# Patient Record
Sex: MALE | Race: WHITE
[De-identification: names, ages, dates, MRNs, and addresses within clinical notes are randomized per-mention and may not be internally consistent; named-entity substitution may affect disease eponyms.]

---

## 2019-08-13 ENCOUNTER — HOSPITAL ENCOUNTER (OUTPATIENT)
Dept: HOSPITAL 92 - RAD | Age: 54
Discharge: HOME | End: 2019-08-13
Attending: INTERNAL MEDICINE
Payer: COMMERCIAL

## 2019-08-13 DIAGNOSIS — R06.00: Primary | ICD-10-CM

## 2019-08-13 PROCEDURE — 71046 X-RAY EXAM CHEST 2 VIEWS: CPT

## 2019-08-13 NOTE — RAD
RADIOGRAPH CHEST 2 VIEWS:

8/13/19

 

HISTORY: 

54-year-old male with dyspnea. 

 

FINDINGS:

There is no air space density, pulmonary edema, pleural effusion, pneumothorax, or cardiomegaly.

 

IMPRESSION: 

No acute cardiopulmonary findings.

 

 

jn []

 

POS: [facility]

## 2019-08-14 ENCOUNTER — HOSPITAL ENCOUNTER (OUTPATIENT)
Dept: HOSPITAL 92 - CT | Age: 54
Discharge: HOME | End: 2019-08-14
Attending: INTERNAL MEDICINE
Payer: COMMERCIAL

## 2019-08-14 DIAGNOSIS — R07.89: Primary | ICD-10-CM

## 2019-08-14 DIAGNOSIS — N28.9: ICD-10-CM

## 2019-08-14 DIAGNOSIS — R59.0: ICD-10-CM

## 2019-08-14 PROCEDURE — 71250 CT THORAX DX C-: CPT

## 2019-08-14 PROCEDURE — A9503 TC99M MEDRONATE: HCPCS

## 2019-08-14 PROCEDURE — 78306 BONE IMAGING WHOLE BODY: CPT

## 2019-08-14 NOTE — CT
CT CHEST WITHOUT CONTRAST



CLINICAL INDICATION:

Chest pain. Patient states chest pain with every breath. 



COMPARISON:

None



FINDINGS:



Aorta: Limited evaluation due to lack of intravenous contrast; the thoracic aorta is normal in calibe
r.



Lungs: Clear without evidence of consolidation or pleural effusion.



Mediastinum: Lack of intravenous contrast limits sensitivity for evaluation of the mediastinum. There
 is mild enlargement of a right paratracheal lymph node measuring 1.4 cm in short axis dimension.

No additional enlarged lymph nodes are appreciated. This is overall nonspecific.



Thyroid gland: Normal nonenhanced CT appearance where visualized.



Osseous structures: Mild degenerative changes are seen within the thoracic spine



Chest wall: No abnormality visualized.





Upper abdomen: There is an incompletely visualized 5.9 cm hypodense cystic lesion superior pole left 
kidney, but where image this does demonstrate fluid attenuation likely represents a renal cyst. The

remainder of the visualized upper abdomen demonstrates a grossly normal nonenhanced CT appearance.







IMPRESSION:

1. No acute findings. 

2. The lungs are clear, and there is no pulmonary nodule, mass, or pleural effusion identified.

3. Nonspecific mildly enlarged right paratracheal lymph node.

4. Incompletely imaged left superior pole renal cystic lesion likely attributable to a left renal cys
t. However, for complete evaluation, a nonemergent ultrasound would be helpful for further

evaluation.



Reported By: Jitendra Beach 

Electronically Signed:  8/14/2019 12:15 PM

## 2019-08-21 ENCOUNTER — HOSPITAL ENCOUNTER (OUTPATIENT)
Dept: HOSPITAL 92 - NM | Age: 54
Discharge: HOME | End: 2019-08-21
Attending: INTERNAL MEDICINE
Payer: COMMERCIAL

## 2019-08-21 DIAGNOSIS — M19.012: ICD-10-CM

## 2019-08-21 DIAGNOSIS — M19.072: ICD-10-CM

## 2019-08-21 DIAGNOSIS — M19.011: ICD-10-CM

## 2019-08-21 DIAGNOSIS — R07.89: Primary | ICD-10-CM

## 2019-08-21 DIAGNOSIS — M19.071: ICD-10-CM

## 2019-08-21 DIAGNOSIS — Z96.651: ICD-10-CM

## 2019-08-21 PROCEDURE — 78306 BONE IMAGING WHOLE BODY: CPT

## 2019-08-21 PROCEDURE — A9503 TC99M MEDRONATE: HCPCS

## 2019-08-21 NOTE — NM
NUCLEAR MEDICINE BONE SCAN WHOLE BODY:

(Skeletal scintigraphy)



DATE:

8/21/2019



HISTORY:

54-year-old male with anterior chest wall pain



TECHNIQUE:

IV injection of technetium 99m-MDP: 30.7 mCi

3 hour delayed whole body skeletal scintigraphy in anterior and posterior views.



FINDINGS:

There are no foci of asymmetrically increased uptake that are particularly suspicious for bone metast
ases. There are multiple tiny foci of increased uptake at the lateral margins of the sternal body,

left more numerous than right. Review of chest CT demonstrates osteophytes at the joints between the 
calcified costal cartilages and the sternum. This would be responsible for the increased uptake.

Symmetrically increased uptake at bilateral AC joints. Photopenic defect at right knee surrounded by 
mildly increased uptake. Bilateral foci of significantly increased uptake at the great toes. Small

focus of increased uptake at right posterior mid lumbar spine, presumably facet osteoarthrosis.



IMPRESSION:



1. No compelling evidence of skeletal metastasis.

2. 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

3. Status post total right knee replacement arthroplasty. Some increased uptake in bone around the ha
rdware.

4. Arthritis at bilateral great toes.

5. Osteoarthrosis of bilateral acromioclavicular joints.



Reported By: Aashish Shahid 

Electronically Signed:  8/21/2019 1:29 PM

## 2020-10-04 ENCOUNTER — HOSPITAL ENCOUNTER (EMERGENCY)
Dept: HOSPITAL 92 - ERS | Age: 55
Discharge: LEFT BEFORE DISCHARGE | End: 2020-10-04
Payer: SELF-PAY

## 2020-10-04 DIAGNOSIS — F17.210: ICD-10-CM

## 2020-10-04 DIAGNOSIS — R07.9: ICD-10-CM

## 2020-10-04 DIAGNOSIS — F32.9: ICD-10-CM

## 2020-10-04 DIAGNOSIS — F41.9: ICD-10-CM

## 2020-10-04 DIAGNOSIS — Z79.899: ICD-10-CM

## 2020-10-04 DIAGNOSIS — I16.0: Primary | ICD-10-CM

## 2020-10-04 LAB
ALBUMIN SERPL BCG-MCNC: 4.8 G/DL (ref 3.5–5)
ALP SERPL-CCNC: 104 U/L (ref 40–110)
ALT SERPL W P-5'-P-CCNC: 48 U/L (ref 8–55)
ANION GAP SERPL CALC-SCNC: 16 MMOL/L (ref 10–20)
AST SERPL-CCNC: 28 U/L (ref 5–34)
BASOPHILS # BLD AUTO: 0.1 THOU/UL (ref 0–0.2)
BASOPHILS NFR BLD AUTO: 0.7 % (ref 0–1)
BILIRUB SERPL-MCNC: 0.5 MG/DL (ref 0.2–1.2)
BUN SERPL-MCNC: 17 MG/DL (ref 8.4–25.7)
CALCIUM SERPL-MCNC: 9.9 MG/DL (ref 7.8–10.44)
CHLORIDE SERPL-SCNC: 101 MMOL/L (ref 98–107)
CO2 SERPL-SCNC: 25 MMOL/L (ref 22–29)
CREAT CL PREDICTED SERPL C-G-VRATE: 0 ML/MIN (ref 70–130)
EOSINOPHIL # BLD AUTO: 0 THOU/UL (ref 0–0.7)
EOSINOPHIL NFR BLD AUTO: 0.2 % (ref 0–10)
GLOBULIN SER CALC-MCNC: 3.3 G/DL (ref 2.4–3.5)
GLUCOSE SERPL-MCNC: 117 MG/DL (ref 70–105)
HGB BLD-MCNC: 20.8 G/DL (ref 14–18)
LIPASE SERPL-CCNC: 27 U/L (ref 8–78)
LYMPHOCYTES # BLD: 2.4 THOU/UL (ref 1.2–3.4)
LYMPHOCYTES NFR BLD AUTO: 28.8 % (ref 21–51)
MAGNESIUM SERPL-MCNC: 2.2 MG/DL (ref 1.6–2.6)
MCH RBC QN AUTO: 32.4 PG (ref 27–31)
MCV RBC AUTO: 97.9 FL (ref 78–98)
MONOCYTES # BLD AUTO: 0.5 THOU/UL (ref 0.11–0.59)
MONOCYTES NFR BLD AUTO: 6.2 % (ref 0–10)
NEUTROPHILS # BLD AUTO: 5.3 THOU/UL (ref 1.4–6.5)
NEUTROPHILS NFR BLD AUTO: 64.1 % (ref 42–75)
PLATELET # BLD AUTO: 253 THOU/UL (ref 130–400)
POTASSIUM SERPL-SCNC: 4.1 MMOL/L (ref 3.5–5.1)
RBC # BLD AUTO: 6.42 MILL/UL (ref 4.7–6.1)
SODIUM SERPL-SCNC: 138 MMOL/L (ref 136–145)
TROPONIN I SERPL DL<=0.01 NG/ML-MCNC: 0.01 NG/ML (ref ?–0.03)
WBC # BLD AUTO: 8.2 THOU/UL (ref 4.8–10.8)

## 2020-10-04 PROCEDURE — 80053 COMPREHEN METABOLIC PANEL: CPT

## 2020-10-04 PROCEDURE — 87635 SARS-COV-2 COVID-19 AMP PRB: CPT

## 2020-10-04 PROCEDURE — 83735 ASSAY OF MAGNESIUM: CPT

## 2020-10-04 PROCEDURE — 84484 ASSAY OF TROPONIN QUANT: CPT

## 2020-10-04 PROCEDURE — 83880 ASSAY OF NATRIURETIC PEPTIDE: CPT

## 2020-10-04 PROCEDURE — 93005 ELECTROCARDIOGRAM TRACING: CPT

## 2020-10-04 PROCEDURE — 71045 X-RAY EXAM CHEST 1 VIEW: CPT

## 2020-10-04 PROCEDURE — 83690 ASSAY OF LIPASE: CPT

## 2020-10-04 PROCEDURE — 85025 COMPLETE CBC W/AUTO DIFF WBC: CPT

## 2020-10-04 PROCEDURE — 36415 COLL VENOUS BLD VENIPUNCTURE: CPT

## 2020-10-04 PROCEDURE — 84443 ASSAY THYROID STIM HORMONE: CPT

## 2020-10-04 PROCEDURE — U0003 INFECTIOUS AGENT DETECTION BY NUCLEIC ACID (DNA OR RNA); SEVERE ACUTE RESPIRATORY SYNDROME CORONAVIRUS 2 (SARS-COV-2) (CORONAVIRUS DISEASE [COVID-19]), AMPLIFIED PROBE TECHNIQUE, MAKING USE OF HIGH THROUGHPUT TECHNOLOGIES AS DESCRIBED BY CMS-2020-01-R: HCPCS

## 2020-10-04 NOTE — RAD
PORTABLE CHEST:

 

HISTORY: 

Chest pain.

 

COMPARISON: 

3/16/2016 study and also a review of an 8/13/2019 exam.

 

FINDINGS: 

Heart size is within normal limits.  The aorta is tortuous.  Interstitial markings are increased, sli
ghtly more prominent than they have been on the prior exam.  No definite confluent infiltrative proce
ss.  

 

IMPRESSION: 

Slightly increased interstitial lung change.  Some of this appears to be chronic in nature but is mor
e prominent than on the prior exams.  No overt pulmonary vascular engorgement is seen.  The possibili
ty of edema is not totally excluded.  I think a multifocal infiltrative process would be less likely 
unless the patient has symptoms.  Some of the difference between the exams may just be related to charlie
hnical factors.

 

POS: OFF

## 2020-10-05 NOTE — DIS
DATE OF ADMISSION:  10/04/2020



DATE OF DISCHARGE:  10/04/2020



PRIMARY CARE PROVIDER:  Rico Bermudez, nurse practitioner. 



The patient left against medical advice on October 4, 2020.



HOSPITAL COURSE:  Mr. Agee was admitted to the hospital on October 4, 2020, for

chest pain, malignant hypertension, and cocaine abuse.  Shortly after admission, the

patient did not wish to stay in the hospital and left against medical advice. 







Job ID:  [id number]

## 2020-10-22 ENCOUNTER — HOSPITAL ENCOUNTER (EMERGENCY)
Dept: HOSPITAL 92 - ERS | Age: 55
Discharge: HOME | End: 2020-10-22
Payer: COMMERCIAL

## 2020-10-22 DIAGNOSIS — R10.30: Primary | ICD-10-CM

## 2020-10-22 DIAGNOSIS — M25.552: ICD-10-CM

## 2020-10-22 DIAGNOSIS — F17.210: ICD-10-CM

## 2020-10-22 DIAGNOSIS — F41.9: ICD-10-CM

## 2020-10-22 DIAGNOSIS — F32.9: ICD-10-CM
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
## 2020-10-22 DIAGNOSIS — I10: ICD-10-CM

## 2020-10-22 DIAGNOSIS — Z85.830: ICD-10-CM

## 2020-10-22 DIAGNOSIS — Z79.899: ICD-10-CM

## 2020-10-22 PROCEDURE — 74177 CT ABD & PELVIS W/CONTRAST: CPT

## 2020-10-22 NOTE — RAD
XR Hip Lt 2-3 View



INDICATION: Slipped with left inguinal pain and injury



COMPARISON: None



FINDINGS:



Bones: No acute osseous abnormality. Bone mineralization appears within normal limits.



Hip joint: Radiographically normal.



SI joints and symphysis pubis: Radiographically normal.



Intrapelvic contents: Visualized bowel gas pattern is within normal limits.



Surrounding soft tissues: Radiographically normal.



IMPRESSION:

1. No acute osseous abnormality.



Reported By: Que Acuña 

Electronically Signed:  10/22/2020 6:34 PM

## 2020-10-22 NOTE — CT
CT OF THE ABDOMEN AND PELVIS WITH CONTRAST:

10/22/20

 

COMPARISON: 

None.

 

HISTORY:

Slip and fall with left groin pain that causes left leg throbbing. 

 

TECHNIQUE:  

Multiple contiguous axial images were obtained in a CT of the abdomen and pelvis with contrast. Sagit
santos and coronal reformats were performed. 

 

FINDINGS: 

There is a 6.6 cm cyst in the left kidney. The liver, gallbladder, right kidney, adrenal glands, sple
en, and pancreas are unremarkable. No free air, free fluid, or stranding changes are seen in the abdo
men or pelvis. There is slight scarring surrounding both kidneys. 

 

The large and small bowel are unremarkable. No abdominal or pelvic lymphadenopathy are seen. 

 

The muscles surrounding the pelvis are symmetric without hematoma or focal fluid collection. Degenera
tive changes are seen in the spine. The sacroiliac joints are fused. There is no evidence of pelvic o
r hip fracture. The visualized inferior thorax is unremarkable. 

 

IMPRESSION: 

1.      No evidence of acute intra-abdominal/pelvic abnormality. 

2.      Left renal cyst. 

 

POS: [facility]

## 2020-10-26 ENCOUNTER — HOSPITAL ENCOUNTER (EMERGENCY)
Dept: HOSPITAL 92 - ERS | Age: 55
Discharge: LEFT BEFORE DISCHARGE | End: 2020-10-26
Payer: SELF-PAY

## 2020-10-26 DIAGNOSIS — F17.210: ICD-10-CM

## 2020-10-26 DIAGNOSIS — I10: ICD-10-CM

## 2020-10-26 DIAGNOSIS — Z85.830: ICD-10-CM

## 2020-10-26 DIAGNOSIS — T59.811A: Primary | ICD-10-CM

## 2020-10-26 LAB
ALBUMIN SERPL BCG-MCNC: 4.2 G/DL (ref 3.5–5)
ALP SERPL-CCNC: 90 U/L (ref 40–110)
ALT SERPL W P-5'-P-CCNC: 51 U/L (ref 8–55)
ANALYZER IN CARDIO: (no result)
ANION GAP SERPL CALC-SCNC: 17 MMOL/L (ref 10–20)
AST SERPL-CCNC: 27 U/L (ref 5–34)
BASE EXCESS STD BLDA CALC-SCNC: 3.1 MEQ/L
BILIRUB SERPL-MCNC: 0.6 MG/DL (ref 0.2–1.2)
BUN SERPL-MCNC: 19 MG/DL (ref 8.4–25.7)
CA-I BLDA-SCNC: 1.18 MMOL/L (ref 1.12–1.3)
CALCIUM SERPL-MCNC: 8.7 MG/DL (ref 7.8–10.44)
CHLORIDE SERPL-SCNC: 104 MMOL/L (ref 98–107)
CO2 SERPL-SCNC: 23 MMOL/L (ref 22–29)
CREAT CL PREDICTED SERPL C-G-VRATE: 0 ML/MIN (ref 70–130)
GLOBULIN SER CALC-MCNC: 2.8 G/DL (ref 2.4–3.5)
GLUCOSE SERPL-MCNC: 98 MG/DL (ref 70–105)
HCO3 BLDA-SCNC: 28 MEQ/L (ref 22–28)
HCT VFR BLDA CALC: 55 % (ref 42–52)
HGB BLD-MCNC: 18.5 G/DL (ref 14–18)
HGB BLDA-MCNC: 18.7 G/DL (ref 14–18)
MCH RBC QN AUTO: 32.3 PG (ref 27–31)
MCV RBC AUTO: 96.9 FL (ref 78–98)
MDIFF COMPLETE?: YES
O2 A-A PPRESDIFF RESPIRATORY: 487.82 MMHG (ref 0–20)
PCO2 BLDA: 43.5 MMHG (ref 35–45)
PH BLDA: 7.43 [PH] (ref 7.35–7.45)
PLATELET # BLD AUTO: 187 THOU/UL (ref 130–400)
PO2 BLDA: 170.8 MMHG (ref 80–100)
POTASSIUM BLD-SCNC: 3.89 MMOL/L (ref 3.7–5.3)
POTASSIUM SERPL-SCNC: 4 MMOL/L (ref 3.5–5.1)
RBC # BLD AUTO: 5.71 MILL/UL (ref 4.7–6.1)
SODIUM SERPL-SCNC: 140 MMOL/L (ref 136–145)
SPECIMEN DRAWN FROM PATIENT: (no result)
WBC # BLD AUTO: 8.9 THOU/UL (ref 4.8–10.8)

## 2020-10-26 PROCEDURE — 82805 BLOOD GASES W/O2 SATURATION: CPT

## 2020-10-26 PROCEDURE — 96374 THER/PROPH/DIAG INJ IV PUSH: CPT

## 2020-10-26 PROCEDURE — 71045 X-RAY EXAM CHEST 1 VIEW: CPT

## 2020-10-26 PROCEDURE — 80053 COMPREHEN METABOLIC PANEL: CPT

## 2020-10-26 PROCEDURE — 94640 AIRWAY INHALATION TREATMENT: CPT

## 2020-10-26 PROCEDURE — 96375 TX/PRO/DX INJ NEW DRUG ADDON: CPT

## 2020-10-26 PROCEDURE — 85025 COMPLETE CBC W/AUTO DIFF WBC: CPT

## 2020-10-26 NOTE — RAD
Exam: Chest one view



HISTORY:Dyspnea



Comparison: 10/4/2020



FINDINGS:

Cardiac silhouette: Normal

Aorta: Unremarkable

Pulmonary vessels: Normal

Costophrenic angles: Clear



LUNGS: Improved aeration lung parenchyma. Residual interstitial opacities do remain. Residual interst
itial infiltrate is suspected.

Pneumothorax: None



Osseous abnormalities: None



IMPRESSION: Improved aeration of the lung parenchyma. Residual interstitial infiltrate is suspected. 
Continued surveillance is recommended.



Reported By: Abimael Nation 

Electronically Signed:  10/26/2020 7:43 AM

## 2021-03-05 ENCOUNTER — HOSPITAL ENCOUNTER (OUTPATIENT)
Dept: HOSPITAL 92 - BICRAD | Age: 56
Discharge: HOME | End: 2021-03-05
Attending: INTERNAL MEDICINE
Payer: COMMERCIAL

## 2021-03-05 DIAGNOSIS — R06.00: Primary | ICD-10-CM

## 2021-03-05 PROCEDURE — 71046 X-RAY EXAM CHEST 2 VIEWS: CPT

## 2021-03-10 ENCOUNTER — HOSPITAL ENCOUNTER (OUTPATIENT)
Dept: HOSPITAL 92 - CT | Age: 56
Discharge: HOME | End: 2021-03-10
Attending: INTERNAL MEDICINE
Payer: COMMERCIAL

## 2021-03-10 DIAGNOSIS — R06.09: Primary | ICD-10-CM

## 2021-03-10 PROCEDURE — 71275 CT ANGIOGRAPHY CHEST: CPT
